# Patient Record
Sex: FEMALE | Race: BLACK OR AFRICAN AMERICAN | NOT HISPANIC OR LATINO | URBAN - METROPOLITAN AREA
[De-identification: names, ages, dates, MRNs, and addresses within clinical notes are randomized per-mention and may not be internally consistent; named-entity substitution may affect disease eponyms.]

---

## 2024-11-11 DIAGNOSIS — Z36.89 ENCOUNTER FOR FETAL ANATOMIC SURVEY: Primary | ICD-10-CM

## 2024-12-18 ENCOUNTER — PROCEDURE VISIT (OUTPATIENT)
Dept: MATERNAL FETAL MEDICINE | Facility: CLINIC | Age: 34
End: 2024-12-18
Payer: COMMERCIAL

## 2024-12-18 ENCOUNTER — OFFICE VISIT (OUTPATIENT)
Dept: MATERNAL FETAL MEDICINE | Facility: CLINIC | Age: 34
End: 2024-12-18
Payer: COMMERCIAL

## 2024-12-18 VITALS — DIASTOLIC BLOOD PRESSURE: 71 MMHG | SYSTOLIC BLOOD PRESSURE: 115 MMHG | WEIGHT: 189.63 LBS

## 2024-12-18 DIAGNOSIS — Z36.2 ENCOUNTER FOR FOLLOW-UP ULTRASOUND OF FETAL ANATOMY: Primary | ICD-10-CM

## 2024-12-18 DIAGNOSIS — Z36.89 ENCOUNTER FOR FETAL ANATOMIC SURVEY: ICD-10-CM

## 2024-12-18 RX ORDER — FERROUS SULFATE 325(65) MG
325 TABLET ORAL
COMMUNITY
Start: 2024-11-01

## 2024-12-18 RX ORDER — ACETAMINOPHEN 325 MG/1
975 TABLET ORAL
COMMUNITY
Start: 2024-10-04 | End: 2025-10-01

## 2024-12-18 NOTE — PROGRESS NOTES
Chief complaint: AMA    Provider requesting consultation: [facility]    34 y.o. [id number] 19w2d EGA.    PMH:History reviewed. No pertinent past medical history.    PObHx:  OB History    Para Term  AB Living   4 3 3     3   SAB IAB Ectopic Multiple Live Births           3      # Outcome Date GA Lbr Jin/2nd Weight Sex Type Anes PTL Lv   4 Current            3 Term 19    M CS-Unspec   TREY   2 Term 01/15/18 41w0d   M Vag-Spont   TREY   1 Term 08 41w0d   F Vag-Spont   TREY       PSH:  Past Surgical History:   Procedure Laterality Date     SECTION         Family history:family history is not on file.    Social history: reports that she has never smoked. She has never used smokeless tobacco. She reports that she does not drink alcohol and does not use drugs.    A detailed fetal anatomical ultrasound was completed today.  See details in imaging section of EPIC.      Advanced Maternal Age (second trimester)  Today I counseled the patient on the relationship between maternal age and fetal aneuploidy.  We discussed the risks and benefits of screening tests versus definitive genetic testing (amniocentesis). She was counseled about her specific age-related risk of fetal aneuploidy. We discussed the limitations of ultrasound in the definitive diagnosis of fetal aneuploidy.  I quoted the patient a 1 in 900 procedure related risk of fetal loss with genetic amniocentesis.  We also discussed cell free fetal DNA screening including the sensitivity and specificity of the test.  Her questions were answered and after today's consultation she did not want to pursue amniocentesis.  She had a negative  NIPT.  Additionally, we discussed the association between advanced maternal age (AMA) and preeclampsia, gestational diabetes, and fetal growth restriction.    Recommendations:  Detailed anatomic survey at 19-20 weeks    Follow-up fetal ultrasound at 32-34 weeks for interval fetal growth  Consider low dose aspirin at  12-16 weeks for preeclampsia risk reduction    The patient was given an opportunity to ask questions about management and the diease process.  She expressed an understanding of and agreement to the above impression and plan. All questions were answered to her satisfaction.  She was given contact information to the [facility] clinic to address further concerns.

## 2025-01-17 ENCOUNTER — TELEPHONE (OUTPATIENT)
Dept: MATERNAL FETAL MEDICINE | Facility: CLINIC | Age: 35
End: 2025-01-17
Payer: OTHER GOVERNMENT

## 2025-01-17 DIAGNOSIS — Z36.89 ENCOUNTER FOR ULTRASOUND TO ASSESS FETAL GROWTH: Primary | ICD-10-CM

## 2025-01-24 ENCOUNTER — PROCEDURE VISIT (OUTPATIENT)
Dept: MATERNAL FETAL MEDICINE | Facility: CLINIC | Age: 35
End: 2025-01-24
Payer: OTHER GOVERNMENT

## 2025-01-24 DIAGNOSIS — Z36.2 ENCOUNTER FOR FOLLOW-UP ULTRASOUND OF FETAL ANATOMY: ICD-10-CM

## 2025-01-24 PROCEDURE — 76816 OB US FOLLOW-UP PER FETUS: CPT | Mod: S$GLB,,, | Performed by: OBSTETRICS & GYNECOLOGY

## 2025-02-05 ENCOUNTER — TELEPHONE (OUTPATIENT)
Dept: MATERNAL FETAL MEDICINE | Facility: CLINIC | Age: 35
End: 2025-02-05
Payer: COMMERCIAL

## 2025-02-05 NOTE — TELEPHONE ENCOUNTER
Left patient a message to return my call at [phone] to see if patient wants to move her appointment to 03/17 to have ultrasound and doctor visit at same time.

## 2025-03-18 ENCOUNTER — OFFICE VISIT (OUTPATIENT)
Dept: MATERNAL FETAL MEDICINE | Facility: CLINIC | Age: 35
End: 2025-03-18
Payer: OTHER GOVERNMENT

## 2025-03-18 ENCOUNTER — PROCEDURE VISIT (OUTPATIENT)
Dept: MATERNAL FETAL MEDICINE | Facility: CLINIC | Age: 35
End: 2025-03-18
Payer: OTHER GOVERNMENT

## 2025-03-18 VITALS — DIASTOLIC BLOOD PRESSURE: 69 MMHG | SYSTOLIC BLOOD PRESSURE: 133 MMHG | WEIGHT: 190.25 LBS

## 2025-03-18 DIAGNOSIS — Z36.89 ENCOUNTER FOR ULTRASOUND TO ASSESS FETAL GROWTH: ICD-10-CM

## 2025-03-18 DIAGNOSIS — O09.523 MULTIGRAVIDA OF ADVANCED MATERNAL AGE IN THIRD TRIMESTER: Primary | ICD-10-CM

## 2025-03-18 NOTE — PROGRESS NOTES
The patient location is: [facility]  The chief complaint leading to consultation is: AMA    Visit type: audiovisual    Face to Face time with patient: 4 min  10 minutes of total time spent on the encounter, which includes face to face time and non-face to face time preparing to see the patient (eg, review of tests), Obtaining and/or reviewing separately obtained history, Documenting clinical information in the electronic or other health record, Independently interpreting results (not separately reported) and communicating results to the patient/family/caregiver, or Care coordination (not separately reported).         Each patient to whom he or she provides medical services by telemedicine is:  (1) informed of the relationship between the physician and patient and the respective role of any other health care provider with respect to management of the patient; and (2) notified that he or she may decline to receive medical services by telemedicine and may withdraw from such care at any time.    Notes:       Maternal Fetal Medicine follow up consult    SUBJECTIVE:     Elaina Sands is a 34 y.o.  female with IUP at 32w1d who is seen in follow up consultation by [facility].  Pregnancy complications include:   Problem   Multigravida of Advanced Maternal Age in Third Trimester       Previous notes reviewed.   No changes to medical, surgical, family, social, or obstetric history.    Interval history since last MFM visit: No new complaints    Medications reviewed.    Care team members:  AUTUMN Ritter NP - Primary OB       OBJECTIVE:   /69 (BP Location: Right arm, Patient Position: Sitting)   Wt 86.3 kg (190 lb 4.1 oz)         Ultrasound performed. See viewpoint for full ultrasound report.  Fetal size is AGA with the EFW at the 26% and the AC at the 42%. The EFW is 1877 g.  A limited repeat fetal anatomic survey shows no abnormalities of the structures that were adequately imaged.  AFV is normal.        ASSESSMENT/PLAN:     34 y.o.  female with IUP at 32w1d    The patient was given an opportunity to ask questions about management and the diease process.  She expressed an understanding of and agreement to the above impression and plan. All questions were answered to her satisfaction.  She was given contact information to the [facility] clinic to address further concerns.